# Patient Record
(demographics unavailable — no encounter records)

---

## 2024-12-23 NOTE — ASSESSMENT
[FreeTextEntry1] : #Hand eczema -chronic, flaring -I have discussed the chronic nature and course of this condition -start clobetasol 0.05% ointment bid to affected areas on the hand prn flare -Proper medication use and side effects discussed, including cutaneous atrophy, telangiectasias, and striae. Avoid long-term use. -gentle skin care, liberal emollients reviewed - hand out given wear cotton gloves at night gloves with wetwork  RTC PRN

## 2024-12-23 NOTE — HISTORY OF PRESENT ILLNESS
[FreeTextEntry1] : Eczema, Skin tas-NPA [de-identified] : 35 year old female patient presents for eczema and skin tags.  eczema location: right hand duration: months   skin tags  location: neck  duration: years  had them removed in the past  currently pregnant

## 2024-12-23 NOTE — PHYSICAL EXAM
[Alert] : alert [Oriented x 3] : ~L oriented x 3 [Declined] : declined [FreeTextEntry3] : Focused exam: -pink scaly plaque on R hand

## 2025-01-06 NOTE — HISTORY OF PRESENT ILLNESS
[de-identified] : 35-year-old-female with no PMHx sent for consultation of anemia during pregnancy. Dx'd as being hypothroid during pregnancy.  Patient is currently 8 months pregnant. Recent lab work on 24 revealed a ferritin of 12 and Hb/Hct of 10.2/32. Repeat labs on 24 showed ferritin of 15 and Hb/Hct 10.3/33.2. Patient has been taking oral iron for about 3 weeks. Denies any constipation or stomach issues. However, no improvement to extreme fatigue. Does also endorse some SOB.  No blood in stool or black stool.    Patient denies needing iron transfusions with her first pregnancy. She did have to have an emergency  with her first child.   /Para:  Previous Iron Transfusion: no Previous Blood Transfusion: no

## 2025-01-06 NOTE — REVIEW OF SYSTEMS
[Patient Intake Form Reviewed] : Patient intake form was reviewed [Fatigue] : fatigue [Shortness Of Breath] : shortness of breath [SOB on Exertion] : shortness of breath during exertion [Diarrhea: Grade 0] : Diarrhea: Grade 0 [Negative] : Allergic/Immunologic [Fever] : no fever [Chills] : no chills [Chest Pain] : no chest pain [Palpitations] : no palpitations [FreeTextEntry2] : increased fatigue  [de-identified] : ezcema during pregancy

## 2025-01-06 NOTE — REVIEW OF SYSTEMS
[Patient Intake Form Reviewed] : Patient intake form was reviewed [Fatigue] : fatigue [Shortness Of Breath] : shortness of breath [SOB on Exertion] : shortness of breath during exertion [Diarrhea: Grade 0] : Diarrhea: Grade 0 [Negative] : Allergic/Immunologic [Fever] : no fever [Chills] : no chills [Chest Pain] : no chest pain [Palpitations] : no palpitations [FreeTextEntry2] : increased fatigue  [de-identified] : ezcema during pregancy

## 2025-01-06 NOTE — HISTORY OF PRESENT ILLNESS
[de-identified] : 35-year-old-female with no PMHx sent for consultation of anemia during pregnancy. Dx'd as being hypothroid during pregnancy.  Patient is currently 8 months pregnant. Recent lab work on 24 revealed a ferritin of 12 and Hb/Hct of 10.2/32. Repeat labs on 24 showed ferritin of 15 and Hb/Hct 10.3/33.2. Patient has been taking oral iron for about 3 weeks. Denies any constipation or stomach issues. However, no improvement to extreme fatigue. Does also endorse some SOB.  No blood in stool or black stool.    Patient denies needing iron transfusions with her first pregnancy. She did have to have an emergency  with her first child.   /Para:  Previous Iron Transfusion: no Previous Blood Transfusion: no

## 2025-01-27 NOTE — REVIEW OF SYSTEMS
[Patient Intake Form Reviewed] : Patient intake form was reviewed [Fatigue] : fatigue [Shortness Of Breath] : shortness of breath [SOB on Exertion] : shortness of breath during exertion [Diarrhea: Grade 0] : Diarrhea: Grade 0 [Negative] : Allergic/Immunologic [Fever] : no fever [Chills] : no chills [Chest Pain] : no chest pain [Palpitations] : no palpitations [de-identified] : ezcema during pregancy [FreeTextEntry2] : increased fatigue

## 2025-01-27 NOTE — HISTORY OF PRESENT ILLNESS
[de-identified] : 35-year-old-female with no PMHx sent for consultation of anemia during pregnancy. Dx'd as being hypothroid during pregnancy.  Patient is currently 8 months pregnant. Recent lab work on 24 revealed a ferritin of 12 and Hb/Hct of 10.2/32. Repeat labs on 24 showed ferritin of 15 and Hb/Hct 10.3/33.2. Patient has been taking oral iron for about 3 weeks. Denies any constipation or stomach issues. However, no improvement to extreme fatigue. Does also endorse some SOB.  No blood in stool or black stool.    Patient denies needing iron transfusions with her first pregnancy. She did have to have an emergency  with her first child.   /Para:  Previous Iron Transfusion: no Previous Blood Transfusion: no     [de-identified] : Patient was unable to tolerate twice a day due to constipation. Only taking iron once a day. Continues to have fatigue and SOB, slightly better than a couple of weeks ago.

## 2025-01-27 NOTE — ASSESSMENT
[FreeTextEntry1] : 35-year-old-female with no PMHx sent for consultation of anemia during pregnancy. Dx'd as being hypothroid during pregnancy.  Patient is currently 8 months pregnant. Recent lab work on 24 revealed a ferritin of 12 and Hb/Hct of 10.2/32. Repeat labs on 24 showed ferritin of 15 and Hb/Hct 10.3/33.2. Patient has been taking oral iron for about 3 weeks. Denies any constipation or stomach issues. However, no improvement to extreme fatigue. Does also endorse some SOB.  No blood in stool or black stool.    Patient denies needing iron transfusions with her first pregnancy. She did have to have an emergency  with her first child.   Plan: -Pt with Hb of 10.7, was 10.4 recently -Is only taking PO iron QOD.  Has no issues of constipation etc as of now. -Told her to take iron polysaccharide 150 mg BID with vitamin C and will see if her Hb improves in 2 weeks.   -Today, Hb still low and so, advised pt to get IV venofer x 5 doses.

## 2025-01-27 NOTE — REVIEW OF SYSTEMS
[Patient Intake Form Reviewed] : Patient intake form was reviewed [Fatigue] : fatigue [Shortness Of Breath] : shortness of breath [SOB on Exertion] : shortness of breath during exertion [Diarrhea: Grade 0] : Diarrhea: Grade 0 [Negative] : Allergic/Immunologic [Fever] : no fever [Chills] : no chills [Chest Pain] : no chest pain [Palpitations] : no palpitations [de-identified] : ezcema during pregancy [FreeTextEntry2] : increased fatigue

## 2025-01-27 NOTE — HISTORY OF PRESENT ILLNESS
[de-identified] : 35-year-old-female with no PMHx sent for consultation of anemia during pregnancy. Dx'd as being hypothroid during pregnancy.  Patient is currently 8 months pregnant. Recent lab work on 24 revealed a ferritin of 12 and Hb/Hct of 10.2/32. Repeat labs on 24 showed ferritin of 15 and Hb/Hct 10.3/33.2. Patient has been taking oral iron for about 3 weeks. Denies any constipation or stomach issues. However, no improvement to extreme fatigue. Does also endorse some SOB.  No blood in stool or black stool.    Patient denies needing iron transfusions with her first pregnancy. She did have to have an emergency  with her first child.   /Para:  Previous Iron Transfusion: no Previous Blood Transfusion: no     [de-identified] : Patient was unable to tolerate twice a day due to constipation. Only taking iron once a day. Continues to have fatigue and SOB, slightly better than a couple of weeks ago.

## 2025-01-27 NOTE — HISTORY OF PRESENT ILLNESS
[de-identified] : 35-year-old-female with no PMHx sent for consultation of anemia during pregnancy. Dx'd as being hypothroid during pregnancy.  Patient is currently 8 months pregnant. Recent lab work on 24 revealed a ferritin of 12 and Hb/Hct of 10.2/32. Repeat labs on 24 showed ferritin of 15 and Hb/Hct 10.3/33.2. Patient has been taking oral iron for about 3 weeks. Denies any constipation or stomach issues. However, no improvement to extreme fatigue. Does also endorse some SOB.  No blood in stool or black stool.    Patient denies needing iron transfusions with her first pregnancy. She did have to have an emergency  with her first child.   /Para:  Previous Iron Transfusion: no Previous Blood Transfusion: no     [de-identified] : Patient was unable to tolerate twice a day due to constipation. Only taking iron once a day. Continues to have fatigue and SOB, slightly better than a couple of weeks ago.

## 2025-01-27 NOTE — REASON FOR VISIT
[Initial Consultation] : an initial consultation for [Follow-Up Visit] : a follow-up visit for [FreeTextEntry2] : anemia in pregnancy

## 2025-01-27 NOTE — REVIEW OF SYSTEMS
[Patient Intake Form Reviewed] : Patient intake form was reviewed [Fatigue] : fatigue [Shortness Of Breath] : shortness of breath [SOB on Exertion] : shortness of breath during exertion [Diarrhea: Grade 0] : Diarrhea: Grade 0 [Negative] : Allergic/Immunologic [Fever] : no fever [Chills] : no chills [Chest Pain] : no chest pain [Palpitations] : no palpitations [FreeTextEntry2] : increased fatigue  [de-identified] : ezcema during pregancy

## 2025-03-19 NOTE — HISTORY OF PRESENT ILLNESS
[FreeTextEntry1] : Patient here for establish care. Patient has been constipated for the past two weeks. [de-identified] : Ms. MOJGAN SOL is a 36 year old female here today to establish care.  C/O constipation Has a hx of it but during hre pregnancy she was okay but in the last few weeks she has gotten more constipated. Has tried stool softeners , Miralax,  TdaP: ? Flu: No Eye: Due

## 2025-03-19 NOTE — PHYSICAL EXAM
[No Acute Distress] : no acute distress [Well Nourished] : well nourished [Clear to Auscultation] : lungs were clear to auscultation bilaterally [No Edema] : there was no peripheral edema [Normal] : affect was normal and insight and judgment were intact [de-identified] : diffusely enlarged thyroid

## 2025-03-19 NOTE — REVIEW OF SYSTEMS
[Constipation] : constipation [Negative] : Psychiatric [Vision Problems] : no vision problems [FreeTextEntry2] : lost weight after delivery

## 2025-03-19 NOTE — HEALTH RISK ASSESSMENT
[Fair] :  ~his/her~ mood as fair [No] : No [No falls in past year] : Patient reported no falls in the past year [0] : 2) Feeling down, depressed, or hopeless: Not at all (0) [Patient reported PAP Smear was normal] : Patient reported PAP Smear was normal [With Family] : lives with family [Unemployed] : unemployed [Single] : single [Feels Safe at Home] : Feels safe at home [Reports changes in dental health] : Reports changes in dental health [Never] : Never [NO] : No [Change in mental status noted] : No change in mental status noted [Sexually Active] : not sexually active [Reports changes in hearing] : Reports no changes in hearing [Reports changes in vision] : Reports no changes in vision [de-identified] : gums disease [PapSmearDate] : 01/2024

## 2025-04-17 NOTE — REVIEW OF SYSTEMS
[Patient Intake Form Reviewed] : Patient intake form was reviewed [Fatigue] : fatigue [Shortness Of Breath] : shortness of breath [SOB on Exertion] : shortness of breath during exertion [Diarrhea: Grade 0] : Diarrhea: Grade 0 [de-identified] : ezcema during pregancy [Negative] : Integumentary [Fever] : no fever [Chills] : no chills [Chest Pain] : no chest pain [Palpitations] : no palpitations [FreeTextEntry2] : some mild fatigue

## 2025-04-17 NOTE — HISTORY OF PRESENT ILLNESS
[de-identified] : 36-year-old-female with no PMHx sent for consultation of anemia during pregnancy. Dx'd as being hypothroid during pregnancy.  Patient is currently 8 months pregnant. Recent lab work on 24 revealed a ferritin of 12 and Hb/Hct of 10.2/32. Repeat labs on 24 showed ferritin of 15 and Hb/Hct 10.3/33.2. Patient has been taking oral iron for about 3 weeks. Denies any constipation or stomach issues. However, no improvement to extreme fatigue. Does also endorse some SOB.  No blood in stool or black stool.    Patient denies needing iron transfusions with her first pregnancy. She did have to have an emergency  with her first child.   /Para:  Previous Iron Transfusion: no Previous Blood Transfusion: no     [de-identified] : 4/17/2025 Post delivery check up for low iron Seeing doctor next week to a surgeon re nodule on thyroid To see Dr Niño

## 2025-04-17 NOTE — HISTORY OF PRESENT ILLNESS
[de-identified] : 36-year-old-female with no PMHx sent for consultation of anemia during pregnancy. Dx'd as being hypothroid during pregnancy.  Patient is currently 8 months pregnant. Recent lab work on 24 revealed a ferritin of 12 and Hb/Hct of 10.2/32. Repeat labs on 24 showed ferritin of 15 and Hb/Hct 10.3/33.2. Patient has been taking oral iron for about 3 weeks. Denies any constipation or stomach issues. However, no improvement to extreme fatigue. Does also endorse some SOB.  No blood in stool or black stool.    Patient denies needing iron transfusions with her first pregnancy. She did have to have an emergency  with her first child.   /Para:  Previous Iron Transfusion: no Previous Blood Transfusion: no     [de-identified] : 4/17/2025 Post delivery check up for low iron Seeing doctor next week to a surgeon re nodule on thyroid To see Dr Niño

## 2025-04-17 NOTE — ASSESSMENT
[FreeTextEntry1] : 36-year-old-female with no PMHx sent for consultation of anemia during pregnancy. Dx'd as being hypothroid during pregnancy.  Patient is currently 8 months pregnant. Recent lab work on 24 revealed a ferritin of 12 and Hb/Hct of 10.2/32. Repeat labs on 24 showed ferritin of 15 and Hb/Hct 10.3/33.2. Patient has been taking oral iron for about 3 weeks. Denies any constipation or stomach issues. However, no improvement to extreme fatigue. Does also endorse some SOB.  No blood in stool or black stool.    Patient denies needing iron transfusions with her first pregnancy. She did have to have an emergency  with her first child.   Plan: -Pt did not respond to oral iron and for this reason, got IV venofer during pregnancy.  -Had the baby on 25 -Hb of 13.1 -Pt doing well; will call back to discuss results next week -Will f/u with PCP and ob/gym and see us if needed in future

## 2025-04-17 NOTE — REVIEW OF SYSTEMS
[Patient Intake Form Reviewed] : Patient intake form was reviewed [Fatigue] : fatigue [Shortness Of Breath] : shortness of breath [SOB on Exertion] : shortness of breath during exertion [Diarrhea: Grade 0] : Diarrhea: Grade 0 [de-identified] : ezcema during pregancy [Negative] : Integumentary [Fever] : no fever [Chills] : no chills [Chest Pain] : no chest pain [Palpitations] : no palpitations [FreeTextEntry2] : some mild fatigue

## 2025-04-28 NOTE — HISTORY OF PRESENT ILLNESS
[de-identified] : 37 yo F referred by Dr. Krause for evaluation of thyroid nodule. She was initially noted to have a thyroid nodule during pregnancy. She was referred for US at that time which showed a 5mm TR5 right nodule and a 4.7x3.4x2.4cm TR3 nodule on the left. FNA was performed which was [place] II. She noticed increased growth of the nodule since then and underwent repeat US in early April which showed the nodule on the right was now 3mm and on the left, 4.7x2.9x4.3. Patient denies family history of endocrine disease. She was on Synthroid 25mch while pregnant but now is off. She has some discomfort from the nodule but no voice change, dysphagia or SOB. Given growth in last 5 months, she is referred for repeat FNA and possible surgical consideration.

## 2025-04-28 NOTE — ASSESSMENT
[FreeTextEntry1] : 37 yo F with enlarging thyroid nodule, over 4 cm, previously benign but with ~ significant volume enlargement over last 5 months.  We had a long discussion about thyroid anatomy, physiology and pathophysiology. We discussed the role of fine needle aspiration biopsies, their interpretation and management. We discussed indication to repeat FNA today. We also discussed management options depending on repeat cytology. If repeat is benign, she inquired about RFA and I provided referral to surgeons that would better be able to evaluate her for this. If she decides to have a surgical approach, I have recommended a left hemithyroidectomy. We discussed the benefits and potential risks of surgery including temporary and permanent hypoparathyroidism as well as temporary and permanent recurrent laryngeal nerve palsy, bleeding and infection. We discussed the use of intraoperative nerve monitoring. We also spoke about the possible need for lifelong thyroid replacement therapy postoperatively. We discussed the expected recovery.  We will re-discuss her choice of management after her FNA results from today come back.  FNA as below:  After informed consent was signed by the patient and witness by the medical assistant, an ultrasound was used to identify the nodules of interest. The skin was prepped with alcohol and 3cc of 1% lidocaine were used to anesthesia. Using a 25G needle under US guidance, a fine needle aspiration was performed of the 4.7cm left thyroid nodule. The nodule was biopsied with three different passes. The specimen was placed on a labeled slide as well as rinsed in Cytolyt solution and a drop reserved for Thyroseq analysis of indeterminate nodules. Hemostasis was confirmed. A small band-aid was placed on the neck. The patient tolerated the procedure well.  I will call with results once available.

## 2025-04-28 NOTE — HISTORY OF PRESENT ILLNESS
[de-identified] : 35 yo F referred by Dr. Krause for evaluation of thyroid nodule. She was initially noted to have a thyroid nodule during pregnancy. She was referred for US at that time which showed a 5mm TR5 right nodule and a 4.7x3.4x2.4cm TR3 nodule on the left. FNA was performed which was [place] II. She noticed increased growth of the nodule since then and underwent repeat US in early April which showed the nodule on the right was now 3mm and on the left, 4.7x2.9x4.3. Patient denies family history of endocrine disease. She was on Synthroid 25mch while pregnant but now is off. She has some discomfort from the nodule but no voice change, dysphagia or SOB. Given growth in last 5 months, she is referred for repeat FNA and possible surgical consideration.

## 2025-04-28 NOTE — CONSULT LETTER
[Dear  ___] : Dear  [unfilled], [( Thank you for referring [unfilled] for consultation for _____ )] : Thank you for referring [unfilled] for consultation for [unfilled] [Please see my note below.] : Please see my note below. [Consult Closing:] : Thank you very much for allowing me to participate in the care of this patient.  If you have any questions, please do not hesitate to contact me. [Sincerely,] : Sincerely, [DrKarmen  ___] : Dr. FOX [FreeTextEntry3] : Renetta Velazco MD

## 2025-04-28 NOTE — PHYSICAL EXAM
[Respiratory Effort] : normal respiratory effort [Normal Rate and Rhythm] : normal rate and rhythm [No Rash or Lesion] : No rash or lesion [Calm] : calm [de-identified] : NAD, well-appearing [de-identified] : anicteric  [de-identified] : large thyroid nodule noted in the left; bedside US showed a mostly solid nodule with several small cystic pockets with some debris/colloid. Normal right gland, small nodule not appreciated.  [de-identified] : soft, nondistended

## 2025-04-28 NOTE — ADDENDUM
[FreeTextEntry1] : PAPANICOLAOU'S STAIN  ---------------------------------------------------------------------------- PATIENT: MOJGAN SOL                 ACCT #: [id number] LOC:  [facility] U #: [id number]                        AGE/SX: 36/F         ROOM: [facility] DR: Renetta Velazco MD                :    1989   BED: RE25                          DIS: STATUS: REG REF ----------------------------------------------------------------------------  SPEC #:             RECD:      STATUS:  KARINA REQ #: [id number]            FARAZ:  ENTERED:      SP TYPE: CYTOLOGY PCP: Paul Natarajan MD [facility] DR: Renetta Velazco MD [facility] DR:    Specimen(s)  Left lobe thyroid nodule, 4.7 cm, solid  Clinical History  Thyroid nodule increased in size, previous thyroid FNA benign result FINAL CYTOLOGIC DIAGNOSIS  Left lobe thyroid nodule, ultrasound guided fine needle aspiration: BENIGN ([place] Category II) - Consistent with follicular nodular disease   Diagnosis Comment  The Diff-Quik and Papanicolaou stained direct smears and ThinPrep slide show benign follicular cells with small uniform nuclei and moderate cytoplasm dispersed in groups and sheets in a hemorrhagic background of abundant thick colloid. Metaplastic changes are also noted. Rare focal areas show poorly preserved follicular cells. ASHLEIGH Lopez(ASCP  ICD Codes  E04.1  Gross Description  Left lobe thyroid nodule: 3 air-dried smears received and prepared for Diff-Quik, 3 direct smears received in 95% alcohol and prepared for Pap stain, 35 ml of light pink fluid received in Cytolyt fixative for 1 Thin Prep slide prepared for Pap stain. ThyroSeq vial received and reserved for molecular studies.   Referred for RFA  at patient request to Dr. Dominguez at [facility].

## 2025-04-28 NOTE — PHYSICAL EXAM
[Respiratory Effort] : normal respiratory effort [Normal Rate and Rhythm] : normal rate and rhythm [No Rash or Lesion] : No rash or lesion [Calm] : calm [de-identified] : NAD, well-appearing [de-identified] : anicteric  [de-identified] : large thyroid nodule noted in the left; bedside US showed a mostly solid nodule with several small cystic pockets with some debris/colloid. Normal right gland, small nodule not appreciated.  [de-identified] : soft, nondistended

## 2025-04-28 NOTE — ADDENDUM
[FreeTextEntry1] : PAPANICOLAOU'S STAIN  ---------------------------------------------------------------------------- PATIENT: MOJGAN SOL                 ACCT #: [id number] LOC:  [facility] U #: [id number]                        AGE/SX: 36/F         ROOM: [facility] DR: Renetta Velazco MD                :    1989   BED: RE25                          DIS: STATUS: REG REF ----------------------------------------------------------------------------  SPEC #:             RECD:      STATUS:  KARINA REQ #: [id number]            FARAZ:  ENTERED:      SP TYPE: CYTOLOGY PCP: Paul Natarajan MD [facility] DR: Renetta Velazco MD [facility] DR:    Specimen(s)  Left lobe thyroid nodule, 4.7 cm, solid  Clinical History  Thyroid nodule increased in size, previous thyroid FNA benign result FINAL CYTOLOGIC DIAGNOSIS  Left lobe thyroid nodule, ultrasound guided fine needle aspiration: BENIGN ([place] Category II) - Consistent with follicular nodular disease   Diagnosis Comment  The Diff-Quik and Papanicolaou stained direct smears and ThinPrep slide show benign follicular cells with small uniform nuclei and moderate cytoplasm dispersed in groups and sheets in a hemorrhagic background of abundant thick colloid. Metaplastic changes are also noted. Rare focal areas show poorly preserved follicular cells. ASHLEIGH Lopez(ASCP  ICD Codes  E04.1  Gross Description  Left lobe thyroid nodule: 3 air-dried smears received and prepared for Diff-Quik, 3 direct smears received in 95% alcohol and prepared for Pap stain, 35 ml of light pink fluid received in Cytolyt fixative for 1 Thin Prep slide prepared for Pap stain. ThyroSeq vial received and reserved for molecular studies.   Referred for RFA  at patient request to Dr. Dominguez at [facility].